# Patient Record
Sex: FEMALE | Race: ASIAN | NOT HISPANIC OR LATINO
[De-identification: names, ages, dates, MRNs, and addresses within clinical notes are randomized per-mention and may not be internally consistent; named-entity substitution may affect disease eponyms.]

---

## 2017-01-17 ENCOUNTER — APPOINTMENT (OUTPATIENT)
Dept: CARDIOLOGY | Facility: CLINIC | Age: 72
End: 2017-01-17

## 2017-04-18 ENCOUNTER — APPOINTMENT (OUTPATIENT)
Dept: CARDIOLOGY | Facility: CLINIC | Age: 72
End: 2017-04-18

## 2017-04-18 VITALS
WEIGHT: 112 LBS | BODY MASS INDEX: 21.99 KG/M2 | DIASTOLIC BLOOD PRESSURE: 72 MMHG | SYSTOLIC BLOOD PRESSURE: 128 MMHG | HEIGHT: 60 IN | HEART RATE: 67 BPM | OXYGEN SATURATION: 97 %

## 2017-05-11 ENCOUNTER — APPOINTMENT (OUTPATIENT)
Dept: CARDIOLOGY | Facility: CLINIC | Age: 72
End: 2017-05-11

## 2017-08-01 ENCOUNTER — APPOINTMENT (OUTPATIENT)
Dept: CARDIOLOGY | Facility: CLINIC | Age: 72
End: 2017-08-01

## 2020-02-11 ENCOUNTER — APPOINTMENT (OUTPATIENT)
Dept: CARDIOLOGY | Facility: CLINIC | Age: 75
End: 2020-02-11
Payer: MEDICARE

## 2020-02-11 ENCOUNTER — NON-APPOINTMENT (OUTPATIENT)
Age: 75
End: 2020-02-11

## 2020-02-11 VITALS
HEART RATE: 74 BPM | OXYGEN SATURATION: 93 % | BODY MASS INDEX: 23.16 KG/M2 | HEIGHT: 60 IN | SYSTOLIC BLOOD PRESSURE: 150 MMHG | WEIGHT: 118 LBS | DIASTOLIC BLOOD PRESSURE: 80 MMHG

## 2020-02-11 DIAGNOSIS — R06.02 SHORTNESS OF BREATH: ICD-10-CM

## 2020-02-11 DIAGNOSIS — I10 ESSENTIAL (PRIMARY) HYPERTENSION: ICD-10-CM

## 2020-02-11 DIAGNOSIS — E78.00 PURE HYPERCHOLESTEROLEMIA, UNSPECIFIED: ICD-10-CM

## 2020-02-11 DIAGNOSIS — R07.9 CHEST PAIN, UNSPECIFIED: ICD-10-CM

## 2020-02-11 PROCEDURE — 93306 TTE W/DOPPLER COMPLETE: CPT

## 2020-02-11 PROCEDURE — 99214 OFFICE O/P EST MOD 30 MIN: CPT

## 2020-02-11 PROCEDURE — 93000 ELECTROCARDIOGRAM COMPLETE: CPT

## 2020-02-11 RX ORDER — NITROGLYCERIN 0.4 MG/1
0.4 TABLET SUBLINGUAL
Qty: 25 | Refills: 2 | Status: ACTIVE | COMMUNITY
Start: 2017-04-18 | End: 1900-01-01

## 2020-02-11 RX ORDER — ASPIRIN ENTERIC COATED TABLETS 81 MG 81 MG/1
81 TABLET, DELAYED RELEASE ORAL DAILY
Refills: 0 | Status: ACTIVE | COMMUNITY

## 2020-02-11 RX ORDER — ROSUVASTATIN CALCIUM 5 MG/1
5 TABLET, FILM COATED ORAL
Refills: 0 | Status: ACTIVE | COMMUNITY

## 2020-02-11 NOTE — DISCUSSION/SUMMARY
[FreeTextEntry1] : In a summary Nydia Neal is an elderly female with chest pain and shortness of breath on exertion, echo done showed normal LV systolic function. Pharmacological nuclear stress test to rule out ischemia as she can not exercise secondary to joint pain. Mean while any chest pains take SL NTG and go to nearest ER. Further plan based on stress test results. Hypertension, controlled. Continue current medications and 2 gm sodium diet. Hypercholesterolemia, on statins and low cholesterol diet.

## 2020-02-11 NOTE — PHYSICAL EXAM
[General Appearance - Well Developed] : well developed [Normal Appearance] : normal appearance [General Appearance - Well Nourished] : well nourished [Well Groomed] : well groomed [No Deformities] : no deformities [General Appearance - In No Acute Distress] : no acute distress [Eyelids - No Xanthelasma] : the eyelids demonstrated no xanthelasmas [Normal Conjunctiva] : the conjunctiva exhibited no abnormalities [No Oral Pallor] : no oral pallor [Normal Oral Mucosa] : normal oral mucosa [Respiration, Rhythm And Depth] : normal respiratory rhythm and effort [Auscultation Breath Sounds / Voice Sounds] : lungs were clear to auscultation bilaterally [Heart Sounds] : normal S1 and S2 [Murmurs] : no murmurs present [Heart Rate And Rhythm] : heart rate and rhythm were normal [Arterial Pulses Normal] : the arterial pulses were normal [Veins - Varicosity Changes] : no varicosital changes were noted in the lower extremities [Edema] : no peripheral edema present [Abdomen Tenderness] : non-tender [Bowel Sounds] : normal bowel sounds [Abdomen Soft] : soft [Abnormal Walk] : normal gait [Nail Clubbing] : no clubbing of the fingernails [Cyanosis, Localized] : no localized cyanosis [Skin Color & Pigmentation] : normal skin color and pigmentation [] : no rash [Skin Turgor] : normal skin turgor [Oriented To Time, Place, And Person] : oriented to person, place, and time [Impaired Insight] : insight and judgment were intact [No Anxiety] : not feeling anxious [FreeTextEntry1] : No JVD.

## 2020-02-11 NOTE — REASON FOR VISIT
[Chest Pain] : chest pain [Follow-Up - Clinic] : a clinic follow-up of [Hypertension] : hypertension [FreeTextEntry1] : shortness of breath on exertions.

## 2020-02-11 NOTE — REVIEW OF SYSTEMS
[Blurry Vision] : blurred vision [Shortness Of Breath] : shortness of breath [Eyeglasses] : currently wearing eyeglasses [Chest Pain] : chest pain [Joint Pain] : joint pain [Negative] : Endocrine [Fever] : no fever [Headache] : no headache [Recent Weight Gain (___ Lbs)] : no recent weight gain [Chills] : no chills [Recent Weight Loss (___ Lbs)] : no recent weight loss [Seeing Double (Diplopia)] : no diplopia [Lower Ext Edema] : no extremity edema [Eye Pain] : no eye pain [Palpitations] : no palpitations [Dizziness] : no dizziness [Joint Swelling] : no joint swelling [Muscle Cramps] : no muscle cramps [Numbness (Hypesthesia)] : no numbness [Tremor] : no tremor was seen [Convulsions] : no convulsions [Easy Bleeding] : no tendency for easy bleeding [Tingling (Paresthesia)] : no tingling [Easy Bruising] : no tendency for easy bruising

## 2020-03-06 ENCOUNTER — OUTPATIENT (OUTPATIENT)
Dept: OUTPATIENT SERVICES | Facility: HOSPITAL | Age: 75
LOS: 1 days | End: 2020-03-06

## 2020-03-06 ENCOUNTER — APPOINTMENT (OUTPATIENT)
Dept: CV DIAGNOSTICS | Facility: HOSPITAL | Age: 75
End: 2020-03-06
Payer: MEDICARE

## 2020-03-06 DIAGNOSIS — I10 ESSENTIAL (PRIMARY) HYPERTENSION: ICD-10-CM

## 2020-03-06 PROCEDURE — 93016 CV STRESS TEST SUPVJ ONLY: CPT | Mod: GC

## 2020-03-06 PROCEDURE — 93018 CV STRESS TEST I&R ONLY: CPT | Mod: GC

## 2020-03-06 PROCEDURE — 78452 HT MUSCLE IMAGE SPECT MULT: CPT | Mod: 26

## 2021-06-14 ENCOUNTER — APPOINTMENT (OUTPATIENT)
Dept: CARDIOLOGY | Facility: CLINIC | Age: 76
End: 2021-06-14

## 2021-06-29 ENCOUNTER — APPOINTMENT (OUTPATIENT)
Dept: UROLOGY | Facility: CLINIC | Age: 76
End: 2021-06-29
Payer: MEDICARE

## 2021-06-29 PROCEDURE — 99203 OFFICE O/P NEW LOW 30 MIN: CPT

## 2021-06-29 NOTE — REVIEW OF SYSTEMS
[Negative] : Heme/Lymph [see HPI] : see HPI [History of kidney stones] : history of kidney stones [Wake up at night to urinate  How many times?  ___] : wakes up to urinate [unfilled] times during the night

## 2021-07-02 NOTE — ASSESSMENT
[FreeTextEntry1] : Kidney stone unchanged from 2014 and in nonobstructing position. Will continue to observe. No indication for intervention. \par --Encourage fluid intake\par --Consider renal US in 1y\par \par Bladder diverticula without clear sequelae. Will observe\par --Bladder US in 1y

## 2021-07-02 NOTE — HISTORY OF PRESENT ILLNESS
[FreeTextEntry1] : 77yo female with cc of kidney stones and bladder diverticula. Pt with hx of complex ovarian ca that is followed with serial imaging. She underwent recent US eval and there was incidental note made of RLP ~4mm. No stones on L. SHe was also noted to have multiple bladder diverticula--3cm posterior, 1cm R lateral and 1.4cm L lateral. \par \par No prior hx of stones but has been told in the past that she has stones (back in 2014 per pt). \par \par At baseline, she has some frequency. Frequency is both day and night. She sometimes drinks tea at 10-11 and can get up more as a result. No pain with urination. No issues with UTIs--never had one. No straining or feelings of incomplete emptying.

## 2021-07-02 NOTE — PHYSICAL EXAM
[General Appearance - Well Developed] : well developed [General Appearance - Well Nourished] : well nourished [General Appearance - In No Acute Distress] : no acute distress [Edema] : no peripheral edema [Exaggerated Use Of Accessory Muscles For Inspiration] : no accessory muscle use [Abdomen Soft] : soft [Abdomen Tenderness] : non-tender [Costovertebral Angle Tenderness] : no ~M costovertebral angle tenderness [Normal Station and Gait] : the gait and station were normal for the patient's age [Skin Color & Pigmentation] : normal skin color and pigmentation [Oriented To Time, Place, And Person] : oriented to person, place, and time

## 2022-02-09 ENCOUNTER — NON-APPOINTMENT (OUTPATIENT)
Age: 77
End: 2022-02-09

## 2022-02-18 ENCOUNTER — APPOINTMENT (OUTPATIENT)
Dept: CARDIOLOGY | Facility: CLINIC | Age: 77
End: 2022-02-18

## 2022-02-28 ENCOUNTER — APPOINTMENT (OUTPATIENT)
Dept: UROLOGY | Facility: CLINIC | Age: 77
End: 2022-02-28
Payer: MEDICARE

## 2022-02-28 ENCOUNTER — OUTPATIENT (OUTPATIENT)
Dept: OUTPATIENT SERVICES | Facility: HOSPITAL | Age: 77
LOS: 1 days | End: 2022-02-28
Payer: COMMERCIAL

## 2022-02-28 DIAGNOSIS — K80.20 CALCULUS OF GALLBLADDER W/OUT CHOLECYSTITIS W/OUT OBSTRUCTION: ICD-10-CM

## 2022-02-28 DIAGNOSIS — R35.0 FREQUENCY OF MICTURITION: ICD-10-CM

## 2022-02-28 PROCEDURE — 99213 OFFICE O/P EST LOW 20 MIN: CPT

## 2022-02-28 PROCEDURE — 76775 US EXAM ABDO BACK WALL LIM: CPT | Mod: 26

## 2022-02-28 PROCEDURE — 76775 US EXAM ABDO BACK WALL LIM: CPT

## 2022-02-28 NOTE — ASSESSMENT
[FreeTextEntry1] : Kidney stone unchanged from 2014 and in nonobstructing position. Not seen on imaging today but no localizing pain or hydro to suggest passing. Will continue to observe. No indication for intervention. \par --Encourage fluid intake\par --Renal US in 1y\par \par Bladder diverticula without clear sequelae. Will observe\par --Bladder US in 1y\par \par Ovarian cyst. \par --F/u GYN\par \par Gallstone and gallbladder neck\par --F/u GI

## 2022-02-28 NOTE — PHYSICAL EXAM
[General Appearance - Well Developed] : well developed [General Appearance - Well Nourished] : well nourished [General Appearance - In No Acute Distress] : no acute distress [Abdomen Soft] : soft [Abdomen Tenderness] : non-tender [Costovertebral Angle Tenderness] : no ~M costovertebral angle tenderness [Skin Color & Pigmentation] : normal skin color and pigmentation [Edema] : no peripheral edema [Exaggerated Use Of Accessory Muscles For Inspiration] : no accessory muscle use [Oriented To Time, Place, And Person] : oriented to person, place, and time [Normal Station and Gait] : the gait and station were normal for the patient's age

## 2022-03-02 DIAGNOSIS — N83.209 UNSPECIFIED OVARIAN CYST, UNSPECIFIED SIDE: ICD-10-CM

## 2022-03-02 DIAGNOSIS — N20.0 CALCULUS OF KIDNEY: ICD-10-CM

## 2022-03-02 DIAGNOSIS — K80.20 CALCULUS OF GALLBLADDER WITHOUT CHOLECYSTITIS WITHOUT OBSTRUCTION: ICD-10-CM

## 2022-03-02 DIAGNOSIS — N32.3 DIVERTICULUM OF BLADDER: ICD-10-CM

## 2022-03-23 ENCOUNTER — APPOINTMENT (OUTPATIENT)
Dept: INTERNAL MEDICINE | Facility: CLINIC | Age: 77
End: 2022-03-23

## 2022-08-28 NOTE — HISTORY OF PRESENT ILLNESS
[FreeTextEntry1] : Nydia Neal is a 74 year old female with history of hypertension and hypercholesterolemia comes for follow up visit. Complaining of mild left sided chest pains on exertion, non radiating relieved with rest in few minutes of few months duration. Also gets similar pains when she sleeps on left side. Mild shortness of breath on climbing stairs which is relieved with rest in few minutes and chronic. No palpitations. Compliant to medications and diet. Can not walk much, limited by joint pains.  No

## 2023-02-27 ENCOUNTER — APPOINTMENT (OUTPATIENT)
Dept: UROLOGY | Facility: CLINIC | Age: 78
End: 2023-02-27

## 2023-04-24 ENCOUNTER — APPOINTMENT (OUTPATIENT)
Dept: UROLOGY | Facility: CLINIC | Age: 78
End: 2023-04-24
Payer: MEDICARE

## 2023-04-24 ENCOUNTER — OUTPATIENT (OUTPATIENT)
Dept: OUTPATIENT SERVICES | Facility: HOSPITAL | Age: 78
LOS: 1 days | End: 2023-04-24
Payer: COMMERCIAL

## 2023-04-24 DIAGNOSIS — R35.0 FREQUENCY OF MICTURITION: ICD-10-CM

## 2023-04-24 LAB
APPEARANCE: ABNORMAL
BACTERIA: NEGATIVE /HPF
BILIRUBIN URINE: NEGATIVE
BLOOD URINE: NEGATIVE
CAST: 0 /LPF
COLOR: YELLOW
EPITHELIAL CELLS: 0 /HPF
GLUCOSE QUALITATIVE U: NEGATIVE MG/DL
KETONES URINE: NEGATIVE MG/DL
LEUKOCYTE ESTERASE URINE: NEGATIVE
MICROSCOPIC-UA: NORMAL
NITRITE URINE: NEGATIVE
PH URINE: 8
PROTEIN URINE: NEGATIVE MG/DL
RED BLOOD CELLS URINE: 0 /HPF
SPECIFIC GRAVITY URINE: 1.01
UROBILINOGEN URINE: 0.2 MG/DL
WHITE BLOOD CELLS URINE: 1 /HPF

## 2023-04-24 PROCEDURE — 99213 OFFICE O/P EST LOW 20 MIN: CPT | Mod: 25

## 2023-04-24 PROCEDURE — 76775 US EXAM ABDO BACK WALL LIM: CPT | Mod: 26

## 2023-04-24 PROCEDURE — 76775 US EXAM ABDO BACK WALL LIM: CPT

## 2023-04-24 NOTE — ASSESSMENT
[FreeTextEntry1] : Uirnary frequency. \par --UA, UCx. will call pt with results\par \par Kidney stone unchanged from 2014 and in nonobstructing position. Not seen on imaging today but no localizing pain or hydro to suggest passing. Will continue to observe. No indication for intervention. \par --Encourage fluid intake\par --Renal US in 1y\par \par Bladder diverticula without clear sequelae. Not seen today Will observe\par --Bladder US in 1y\par \par Ovarian cyst. \par --F/u GYN\par \par

## 2023-04-24 NOTE — HISTORY OF PRESENT ILLNESS
[FreeTextEntry1] : 75yo female with cc of kidney stones and bladder diverticula. Pt with hx of complex ovarian ca that is followed with serial imaging. She US eval 4/2021 and there was incidental note made of RLP ~4mm. No stones on L. SHe was also noted to have multiple bladder diverticula--3cm posterior, 1cm R lateral and 1.4cm L lateral. No prior hx of stones but has been told in the past that she has stones (back in 2014 per pt). At baseline, she has some frequency. Frequency is both day and night. She sometimes drinks tea at 10-11 and can get up more as a result. No pain with urination. No issues with UTIs--never had one. No straining or feelings of incomplete emptying. Plan was made for US re-eval of stones in kidney and bladder in 1y. \par \par Pt returns today for follow-up. SHe continues to have intermittent diffuse abd pain. Pain is intermittent. Also with back pain attribute to arthritis and L hip pain. She had recent PCP visit. Urine showed no blood. Last US was last year with stone not well seen. Bladder unchanged. There was incidental note made of gallstone as well. R ovarian cyst is much bigger. She never saw GYN for this. [place] \par \par No issues with infection. She was having some increased nocturia. gets up every 2h at night. SOmetimes curbs fluid before bed. No caffeine. No issues with constipation. \par \par US today unable to see stones. Bladder decompressed

## 2023-04-26 LAB — BACTERIA UR CULT: NORMAL

## 2023-05-01 DIAGNOSIS — N32.3 DIVERTICULUM OF BLADDER: ICD-10-CM

## 2023-05-01 DIAGNOSIS — R35.0 FREQUENCY OF MICTURITION: ICD-10-CM

## 2023-05-01 DIAGNOSIS — N20.0 CALCULUS OF KIDNEY: ICD-10-CM

## 2024-03-13 ENCOUNTER — APPOINTMENT (OUTPATIENT)
Dept: UROLOGY | Facility: CLINIC | Age: 79
End: 2024-03-13
Payer: MEDICARE

## 2024-03-13 DIAGNOSIS — N32.3 DIVERTICULUM OF BLADDER: ICD-10-CM

## 2024-03-13 DIAGNOSIS — N20.0 CALCULUS OF KIDNEY: ICD-10-CM

## 2024-03-13 DIAGNOSIS — N83.209 UNSPECIFIED OVARIAN CYST, UNSPECIFIED SIDE: ICD-10-CM

## 2024-03-13 PROCEDURE — 76775 US EXAM ABDO BACK WALL LIM: CPT | Mod: 26

## 2024-03-13 PROCEDURE — G2211 COMPLEX E/M VISIT ADD ON: CPT

## 2024-03-13 PROCEDURE — 99213 OFFICE O/P EST LOW 20 MIN: CPT

## 2024-03-13 NOTE — HISTORY OF PRESENT ILLNESS
[Nocturia] : nocturia [FreeTextEntry1] : 79yo female with cc of kidney stones and bladder diverticula. Pt with hx of complex ovarian ca that is followed with serial imaging. She US eval 4/2021 and there was incidental note made of RLP ~4mm. No stones on L. SHe was also noted to have multiple bladder diverticula--3cm posterior, 1cm R lateral and 1.4cm L lateral. No prior hx of stones but has been told in the past that she has stones (back in 2014 per pt). At baseline, she has some frequency. Frequency is both day and night. She sometimes drinks tea at 10-11 and can get up more as a result. No pain with urination. No issues with UTIs--never had one. No straining or feelings of incomplete emptying. Plan was made for US re-eval of stones in kidney and bladder in 1y.   Pt returns today for follow-up. SHe continues to have intermittent diffuse abd pain. Pain is intermittent. Also with back pain attribute to arthritis and L hip pain. She had recent PCP visit. Urine showed no blood. Last US was last year with stone not well seen. Bladder unchanged. There was incidental note made of gallstone as well. R ovarian cyst is much bigger. She never saw GYN for this. [place]. Since last visit in has  not seen gynecologist and requests one be referred to her.   No issues with infection. Pt complained of nocturia during last visit getting up to urinate every 2h. Sxs have since improved w/ no intervention, getting up only 3x/night. Sometimes curbs fluid before bed. No caffeine. No issues with constipation. US in 2023 showed no stones. Bladder decompressed.  Since pt was last seen, denies any symptomatic stones, flank pain, dysuria, frequency or urgency. Over the past year however, pt reports left gluteal pain, burning in sensation, which radiates downward towards LLE. Pain w/ associated numbness.  [Urinary Incontinence] : no urinary incontinence [Urinary Retention] : no urinary retention [Urinary Urgency] : no urinary urgency [Urinary Frequency] : no urinary frequency [Intermittency] : no intermittency [Dysuria] : no dysuria [Fever] : no fever

## 2024-03-13 NOTE — PHYSICAL EXAM
[General Appearance - Well Nourished] : well nourished [General Appearance - In No Acute Distress] : no acute distress [Skin Color & Pigmentation] : normal skin color and pigmentation [Exaggerated Use Of Accessory Muscles For Inspiration] : no accessory muscle use [Normal Station and Gait] : the gait and station were normal for the patient's age [General Appearance - Well Developed] : well developed [Edema] : no peripheral edema [Normal Appearance] : normal appearance [Abdomen Soft] : soft [Abdomen Tenderness] : non-tender [Costovertebral Angle Tenderness] : no ~M costovertebral angle tenderness [Oriented To Time, Place, And Person] : oriented to person, place, and time

## 2024-03-14 PROBLEM — N32.3 BLADDER DIVERTICULUM: Status: ACTIVE | Noted: 2021-06-29

## 2024-03-14 PROBLEM — N83.209 OVARIAN CYST: Status: ACTIVE | Noted: 2022-02-28

## 2024-04-02 ENCOUNTER — APPOINTMENT (OUTPATIENT)
Dept: ORTHOPEDIC SURGERY | Facility: CLINIC | Age: 79
End: 2024-04-02

## 2024-10-14 ENCOUNTER — APPOINTMENT (OUTPATIENT)
Dept: UROLOGY | Facility: CLINIC | Age: 79
End: 2024-10-14

## 2024-12-26 NOTE — HISTORY OF PRESENT ILLNESS
[FreeTextEntry1] : 75yo female with cc of kidney stones and bladder diverticula. Pt with hx of complex ovarian ca that is followed with serial imaging. She US eval 4/2021 and there was incidental note made of RLP ~4mm. No stones on L. SHe was also noted to have multiple bladder diverticula--3cm posterior, 1cm R lateral and 1.4cm L lateral. No prior hx of stones but has been told in the past that she has stones (back in 2014 per pt). At baseline, she has some frequency. Frequency is both day and night. She sometimes drinks tea at 10-11 and can get up more as a result. No pain with urination. No issues with UTIs--never had one. No straining or feelings of incomplete emptying. Plan was made for US re-eval of stones in kidney and bladder in 1y. \par \par Pt returns today for follow-up. SHe has intermittent diffuse abd pain. Pain is intermittent. Also with back pain attribute to arthritis. \par \par US today shows no hydro. Stone not well seen. Bladder unchanged. There was incidental note made of gallstone as well. R ovarian cyst is much bigger than seen on US last year (5cm vs 7mm) Please use 650mg tylenol (or acetaminophen) every 6 hours and 600mg motrin (or advil or ibuprofen) every 6 hours as needed for pain/discomfort/swelling.  Make sure not to use more than 3500mg in any 24 hour period.     Please follow-up with your doctor(s) within the next 3 days, but see medical sooner if your symptoms persist or worsen.  Please call tomorrow for an appointment.    If you have any worsening of symptoms or any other concerns please see your doctor or return to the ED immediately.

## 2025-03-19 ENCOUNTER — APPOINTMENT (OUTPATIENT)
Dept: UROLOGY | Facility: CLINIC | Age: 80
End: 2025-03-19

## 2025-07-16 ENCOUNTER — APPOINTMENT (OUTPATIENT)
Dept: CARDIOLOGY | Facility: CLINIC | Age: 80
End: 2025-07-16